# Patient Record
Sex: MALE | Race: WHITE | NOT HISPANIC OR LATINO | Employment: FULL TIME | URBAN - METROPOLITAN AREA
[De-identification: names, ages, dates, MRNs, and addresses within clinical notes are randomized per-mention and may not be internally consistent; named-entity substitution may affect disease eponyms.]

---

## 2017-01-19 ENCOUNTER — OFFICE VISIT (OUTPATIENT)
Dept: INTERNAL MEDICINE | Facility: CLINIC | Age: 37
End: 2017-01-19
Payer: COMMERCIAL

## 2017-01-19 VITALS
HEART RATE: 130 BPM | TEMPERATURE: 97 F | BODY MASS INDEX: 40.19 KG/M2 | DIASTOLIC BLOOD PRESSURE: 80 MMHG | SYSTOLIC BLOOD PRESSURE: 116 MMHG | HEIGHT: 68 IN | WEIGHT: 265.19 LBS

## 2017-01-19 DIAGNOSIS — M54.40 CHRONIC BILATERAL LOW BACK PAIN WITH SCIATICA, SCIATICA LATERALITY UNSPECIFIED: Primary | ICD-10-CM

## 2017-01-19 DIAGNOSIS — G89.29 CHRONIC BILATERAL LOW BACK PAIN WITH SCIATICA, SCIATICA LATERALITY UNSPECIFIED: Primary | ICD-10-CM

## 2017-01-19 PROCEDURE — 99204 OFFICE O/P NEW MOD 45 MIN: CPT | Mod: S$GLB,,, | Performed by: FAMILY MEDICINE

## 2017-01-19 PROCEDURE — 1159F MED LIST DOCD IN RCRD: CPT | Mod: S$GLB,,, | Performed by: FAMILY MEDICINE

## 2017-01-19 PROCEDURE — 99999 PR PBB SHADOW E&M-NEW PATIENT-LVL III: CPT | Mod: PBBFAC,,, | Performed by: FAMILY MEDICINE

## 2017-01-19 RX ORDER — TIZANIDINE 4 MG/1
TABLET ORAL
Refills: 1 | COMMUNITY
Start: 2016-12-21 | End: 2017-01-19 | Stop reason: SDUPTHER

## 2017-01-19 RX ORDER — HYDROCODONE BITARTRATE AND ACETAMINOPHEN 7.5; 325 MG/1; MG/1
TABLET ORAL
Refills: 0 | COMMUNITY
Start: 2016-12-23 | End: 2017-01-19 | Stop reason: SDUPTHER

## 2017-01-19 RX ORDER — IBUPROFEN 800 MG/1
TABLET ORAL
Refills: 0 | COMMUNITY
Start: 2016-12-21

## 2017-01-19 RX ORDER — HYDROCODONE BITARTRATE AND ACETAMINOPHEN 7.5; 325 MG/1; MG/1
TABLET ORAL
Qty: 120 TABLET | Refills: 0 | Status: SHIPPED | OUTPATIENT
Start: 2017-01-19 | End: 2017-02-18 | Stop reason: SDUPTHER

## 2017-01-19 RX ORDER — TIZANIDINE 4 MG/1
TABLET ORAL
Qty: 60 TABLET | Refills: 3 | Status: SHIPPED | OUTPATIENT
Start: 2017-01-19

## 2017-01-19 RX ORDER — METHOCARBAMOL 750 MG/1
750 TABLET, FILM COATED ORAL 3 TIMES DAILY
Qty: 90 TABLET | Refills: 1 | Status: SHIPPED | OUTPATIENT
Start: 2017-01-19 | End: 2017-06-05 | Stop reason: SDUPTHER

## 2017-01-19 NOTE — MR AVS SNAPSHOT
[facility]  [address]  [place] [address]  Phone: [phone]  Fax: [phone]                  Carlito Rodriguez   2017 8:20 AM   Office Visit    Description:  Male : 1980   Provider:  Amos Montoya MD   Department:  [facility]           Reason for Visit     Establish Care     Medication Refill           Diagnoses this Visit        Comments    Chronic bilateral low back pain with sciatica, sciatica laterality unspecified    -  Primary Will try pt on Zanaflex 4mg will add methocarbamol 750 mg and continue with the norco for pain           To Do List           Goals (5 Years of Data)     None      Follow-Up and Disposition     Return in about 3 months (around 2017).       These Medications        Disp Refills Start End    methocarbamol (ROBAXIN) 750 MG Tab 90 tablet 1 2017    Take 1 tablet (750 mg total) by mouth 3 (three) times daily. - Oral    [facility] [address] [place], [place] [address] - [address] AT [facility] Ph #: [phone]       hydrocodone-acetaminophen 7.5-325mg (NORCO) 7.5-325 mg per tablet 120 tablet 0 2017     TK 1 T PO Q 4 H PRN    [facility] [address] [place] [address] [place] - [address] AT [facility] Ph #: [phone]       tizanidine (ZANAFLEX) 4 MG tablet 60 tablet 3 2017     TK 2 TS PO QHS PRN    Pharmacy: [facility] [address] [place], [place] [address] - [address] AT [facility] Ph #: [phone]         [facility]     [facility] -  Assistance  Registered nurses in the [facility] provide clinical advisement, health education, appointment booking, and other advisory services.  Call for this free service at [phone].             Medications           Message regarding Medications     Verify the changes and/or additions to your medication regime listed below are the same as discussed with your  "clinician today.  If any of these changes or additions are incorrect, please notify your healthcare provider.        START taking these NEW medications        Refills    methocarbamol (ROBAXIN) 750 MG Tab 1    Sig: Take 1 tablet (750 mg total) by mouth 3 (three) times daily.    Class: Normal    Route: Oral    hydrocodone-acetaminophen 7.5-325mg (NORCO) 7.5-325 mg per tablet 0    Sig: TK 1 T PO Q 4 H PRN    Class: Normal    tizanidine (ZANAFLEX) 4 MG tablet 3    Sig: TK 2 TS PO QHS PRN    Class: Normal           Verify that the below list of medications is an accurate representation of the medications you are currently taking.  If none reported, the list may be blank. If incorrect, please contact your healthcare provider. Carry this list with you in case of emergency.           Current Medications     hydrocodone-acetaminophen 7.5-325mg (NORCO) 7.5-325 mg per tablet TK 1 T PO Q 4 H PRN    ibuprofen (ADVIL,MOTRIN) 800 MG tablet TK 1 T PO BID PRN    methocarbamol (ROBAXIN) 750 MG Tab Take 1 tablet (750 mg total) by mouth 3 (three) times daily.    tizanidine (ZANAFLEX) 4 MG tablet TK 2 TS PO QHS PRN           Clinical Reference Information           Vital Signs - Last Recorded  Most recent update: 1/19/2017  8:23 AM by Sabina Garcia LPN    BP Pulse Temp    116/80 (BP Location: Right arm, Patient Position: Sitting, BP Method: Manual) (!) 130 97 °F (36.1 °C) (Tympanic)    Ht Wt BMI    5' 8" (1.727 m) 120.3 kg (265 lb 3.4 oz) 40.33 kg/m2      Blood Pressure          Most Recent Value    BP  116/80      Allergies as of 1/19/2017     No Known Allergies      Immunizations Administered on Date of Encounter - 1/19/2017     None      [facility] Sign-Up     Activating your [facility] account is as easy as 1-2-3!     1) Visit [email or web address], select Sign Up Now, enter this activation code and your date of birth, then select Next.  [email or web address]  Expires: 3/5/2017  9:04 AM      2) Create a username and password to use when " you visit [facility] in the future and select a security question in case you lose your password and select Next.    3) Enter your e-mail address and click Sign Up!    Additional Information  If you have questions, please e-mail [email or web address] or call [phone] to talk to our [employer] staff. Remember, [facility] is NOT to be used for urgent needs. For medical emergencies, dial 911.
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
## 2017-02-18 NOTE — TELEPHONE ENCOUNTER
----- Message from Moriah Conroy sent at 2/17/2017  4:55 PM CST -----  narco refill needed...[phone] (home)   .  [facility] - [place] - [address] AT [place]  [address] [address]  Phone: [phone] Fax: [phone]

## 2017-02-19 RX ORDER — HYDROCODONE BITARTRATE AND ACETAMINOPHEN 7.5; 325 MG/1; MG/1
TABLET ORAL
Qty: 120 TABLET | Refills: 0 | Status: SHIPPED | OUTPATIENT
Start: 2017-02-19

## 2017-06-06 RX ORDER — METHOCARBAMOL 750 MG/1
TABLET, FILM COATED ORAL
Qty: 90 TABLET | Refills: 0 | Status: SHIPPED | OUTPATIENT
Start: 2017-06-06